# Patient Record
Sex: FEMALE | Race: WHITE
[De-identification: names, ages, dates, MRNs, and addresses within clinical notes are randomized per-mention and may not be internally consistent; named-entity substitution may affect disease eponyms.]

---

## 2021-11-21 ENCOUNTER — HOSPITAL ENCOUNTER (EMERGENCY)
Dept: HOSPITAL 33 - ED | Age: 26
LOS: 1 days | Discharge: HOME | End: 2021-11-22
Payer: COMMERCIAL

## 2021-11-21 DIAGNOSIS — R11.2: ICD-10-CM

## 2021-11-21 DIAGNOSIS — I10: ICD-10-CM

## 2021-11-21 DIAGNOSIS — K52.9: Primary | ICD-10-CM

## 2021-11-21 DIAGNOSIS — N39.0: ICD-10-CM

## 2021-11-21 LAB
ALBUMIN SERPL-MCNC: 4 G/DL (ref 3.5–5)
ALP SERPL-CCNC: 37 U/L (ref 38–126)
ALT SERPL-CCNC: 22 U/L (ref 0–35)
AMYLASE SERPL-CCNC: 52 U/L (ref 30–110)
ANION GAP SERPL CALC-SCNC: 14.4 MEQ/L (ref 5–15)
AST SERPL QL: 27 U/L (ref 14–36)
BASOPHILS # BLD AUTO: 0.04 10*3/UL (ref 0–0.4)
BASOPHILS NFR BLD AUTO: 0.3 % (ref 0–0.4)
BILIRUB BLD-MCNC: 0.4 MG/DL (ref 0.2–1.3)
BUN SERPL-MCNC: 15 MG/DL (ref 7–17)
CALCIUM SPEC-MCNC: 9.1 MG/DL (ref 8.4–10.2)
CHLORIDE SERPL-SCNC: 99 MMOL/L (ref 98–107)
CO2 SERPL-SCNC: 26 MMOL/L (ref 22–30)
CREAT SERPL-MCNC: 0.86 MG/DL (ref 0.52–1.04)
EOSINOPHIL # BLD AUTO: 0.12 10*3/UL (ref 0–0.5)
GFR SERPLBLD BASED ON 1.73 SQ M-ARVRAT: > 60 ML/MIN
GLUCOSE SERPL-MCNC: 114 MG/DL (ref 74–106)
GLUCOSE UR-MCNC: NEGATIVE MG/DL
HCT VFR BLD AUTO: 40.4 % (ref 35–47)
HGB BLD-MCNC: 13.5 GM/DL (ref 12–16)
LIPASE SERPL-CCNC: 122 U/L (ref 23–300)
LYMPHOCYTES # SPEC AUTO: 2.1 10*3/UL (ref 1–4.6)
MCH RBC QN AUTO: 29.7 PG (ref 26–32)
MCHC RBC AUTO-ENTMCNC: 33.4 G/DL (ref 32–36)
MONOCYTES # BLD AUTO: 1.84 10*3/UL (ref 0–1.3)
PLATELET # BLD AUTO: 316 K/MM3 (ref 150–450)
POTASSIUM SERPLBLD-SCNC: 3.4 MMOL/L (ref 3.5–5.1)
PROT SERPL-MCNC: 6.8 G/DL (ref 6.3–8.2)
PROT UR STRIP-MCNC: 100 MG/DL
RBC # BLD AUTO: 4.55 M/MM3 (ref 4.1–5.4)
RBC #/AREA URNS HPF: (no result) /HPF (ref 0–2)
SODIUM SERPL-SCNC: 136 MMOL/L (ref 137–145)
WBC # BLD AUTO: 15.9 K/MM3 (ref 4–10.5)
WBC #/AREA URNS HPF: (no result) /HPF (ref 0–5)

## 2021-11-21 PROCEDURE — 99284 EMERGENCY DEPT VISIT MOD MDM: CPT

## 2021-11-21 PROCEDURE — 96374 THER/PROPH/DIAG INJ IV PUSH: CPT

## 2021-11-21 PROCEDURE — 96360 HYDRATION IV INFUSION INIT: CPT

## 2021-11-21 PROCEDURE — 87086 URINE CULTURE/COLONY COUNT: CPT

## 2021-11-21 PROCEDURE — 36415 COLL VENOUS BLD VENIPUNCTURE: CPT

## 2021-11-21 PROCEDURE — 83605 ASSAY OF LACTIC ACID: CPT

## 2021-11-21 PROCEDURE — 96375 TX/PRO/DX INJ NEW DRUG ADDON: CPT

## 2021-11-21 PROCEDURE — 81001 URINALYSIS AUTO W/SCOPE: CPT

## 2021-11-21 PROCEDURE — 74176 CT ABD & PELVIS W/O CONTRAST: CPT

## 2021-11-21 PROCEDURE — 83690 ASSAY OF LIPASE: CPT

## 2021-11-21 PROCEDURE — 80053 COMPREHEN METABOLIC PANEL: CPT

## 2021-11-21 PROCEDURE — 36000 PLACE NEEDLE IN VEIN: CPT

## 2021-11-21 PROCEDURE — 82150 ASSAY OF AMYLASE: CPT

## 2021-11-21 PROCEDURE — 85025 COMPLETE CBC W/AUTO DIFF WBC: CPT

## 2021-11-21 NOTE — ERPHSYRPT
- History of Present Illness


Time Seen by Provider: 11/21/21 22:22


Historian: patient


Exam Limitations: no limitations


Physician History: 





This is a 26-year-old white female with a history of hypertension and presents 

with periumbilical, mid abdomen abdominal cramping that significant and began 

relatively sudden onset this morning.  It had nearly completely resolved then 

returned to worse this evening prior to arrival.  She has had several episodes 

of vomiting.  She has never had anything like this in the past.  She has had no 

prior abdominal surgeries.  The pain is localized in this region and does not 

radiate.  She has no chest pain.  She has no shortness of breath.  She has not 

had fevers.  She has had no diarrhea.


Timing/Duration: today


Abdominal Pain Onset Location: periumbilical


Pain Radiation: no radiation


Severity of Pain-Max: moderate


Severity of Pain-Current: moderate


Modifying Factors: Improves With: vomiting


Associated Symptoms: loss of appetite, nausea, vomiting, No chest pain, No 

diarrhea, No fever/chills, No shortness of breath


Previous symptoms: no prior history


Allergies/Adverse Reactions: 








No Known Drug Allergies Allergy (Unverified 11/21/21 22:24)


   





Home Medications: 








Cyclosporine 100 mg PO DAILY 11/21/21 [History]


Enalapril Maleate 10 mg*** [Vasotec 10 MG***] 10 mg PO DAILY 11/21/21 [History]


Hydrochlorothiazide 50 mg PO DAILY 11/21/21 [History]


Montelukast Sodium 10 mg PO DAILY 11/21/21 [History]


Prednisone 5 mg*** [Deltasone 5 mg***] 5 mg PO DAILY 11/21/21 [History]








Travel Risk





- International Travel


Have you traveled outside of the country in past 3 weeks: No





- Coronavirus Screening


Are you exhibiting any of the following symptoms?: No


Close contact with a COVID-19 positive Pt in past 14-21 Days: No





- Review of Systems


Constitutional: No Symptoms


Eyes: No Symptoms


Ears, Nose, & Throat: No Symptoms


Respiratory: No Symptoms


Cardiac: No Symptoms


Abdominal/Gastrointestinal: Abdominal Pain, Nausea, Vomiting


Genitourinary Symptoms: No Symptoms


Musculoskeletal: No Symptoms


Skin: No Symptoms


Neurological: No Symptoms


Psychological: No Symptoms


Endocrine: No Symptoms


Hematologic/Lymphatic: No Symptoms


Immunological/Allergic: No Symptoms


All Other Systems: Reviewed and Negative





- Past Medical History


Pertinent Past Medical History: Yes





- Past Surgical History


Past Surgical History: No





- Nursing Vital Signs


Nursing Vital Signs: 


                               Initial Vital Signs











Temperature  97.3 F   11/21/21 22:14


 


Pulse Rate  76   11/21/21 22:14


 


Respiratory Rate  18   11/21/21 22:14


 


Blood Pressure  120/78   11/21/21 22:14


 


O2 Sat by Pulse Oximetry  99   11/21/21 22:14








                                   Pain Scale











Pain Intensity                 10

















- Physical Exam


General Appearance: mild distress, alert, anxiety


Eye Exam: PERRL/EOMI, eyes nml inspection


Ears, Nose, Throat Exam: normal ENT inspection, moist mucous membranes


Neck Exam: normal inspection, non-tender, supple, full range of motion


Respiratory Exam: normal breath sounds, lungs clear, airway intact, No chest 

tenderness, No respiratory distress


Cardiovascular Exam: regular rate/rhythm, normal heart sounds, normal peripheral

 pulses


Gastrointestinal/Abdomen Exam: soft, normal bowel sounds, tenderness (Mid 

abdomen/periumbilical region), guarding, rebound, No pulsatile mass


Pelvic Exam: not done


Rectal Exam: not done


Back Exam: normal inspection, normal range of motion, No CVA tenderness, No 

vertebral tenderness


Extremity Exam: normal inspection, normal range of motion, pelvis stable


Neurologic Exam: alert, oriented x 3, cooperative, CNs II-XII nml as tested, 

normal mood/affect, nml cerebellar function, nml station & gait, sensation nml


Skin Exam: normal color, warm, dry


Lymphatic Exam: No adenopathy


**SpO2 Interpretation**: normal


SpO2: 99


O2 Delivery: Room Air





- Course


Nursing assessment & vital signs reviewed: Yes


Ordered Tests: 


                               Active Orders 24 hr











 Category Date Time Status


 


 IV Insertion STAT Care  11/21/21 22:37 Active


 


 ABDOMEN AND PELVIS W/0 CONTRAS [CT] Stat Exams  11/21/21 22:37 Taken


 


 AMYLASE Stat Lab  11/21/21 22:41 Completed


 


 CBC W DIFF Stat Lab  11/21/21 22:41 Completed


 


 CMP Stat Lab  11/21/21 22:41 Completed


 


 CULTURE,URINE Stat Lab  11/21/21 22:41 Received


 


 LIPASE Stat Lab  11/21/21 22:41 Completed


 


 Lactic Acid Stat Lab  11/21/21 22:52 Received


 


 UA W/RFX UR CULTURE Stat Lab  11/21/21 22:41 Completed








Medication Summary














Discontinued Medications














Generic Name Dose Route Start Last Admin





  Trade Name Freq  PRN Reason Stop Dose Admin


 


Hydromorphone HCl  1 mg  11/21/21 22:37  11/21/21 22:52





  Hydromorphone 1 Mg/1ml Inj*** 1 Mg/Ml Syringe  IV  11/21/21 22:38  1 mg





  STAT ONE   Administration


 


Hydromorphone HCl  Confirm  11/21/21 22:43 





  Hydromorphone 1 Mg/1ml Inj*** 1 Mg/Ml Syringe  Administered  11/21/21 22:44 





  Dose  





  1 mg  





  .ROUTE  





  .STK-MED ONE  


 


Sodium Chloride  1,000 mls @ 999 mls/hr  11/21/21 22:37  11/21/21 23:47





  Sodium Chloride 0.9% 1000 Ml  IV  11/21/21 23:37  Infused





  .Q1H1M STA   Infusion


 


Sodium Chloride  Confirm  11/21/21 22:43 





  Sodium Chloride 0.9% 1000 Ml  Administered  11/21/21 22:44 





  Dose  





  1,000 mls @ ud  





  .ROUTE  





  .STK-MED ONE  


 


Ceftriaxone Sodium/Dextrose  1 g in 50 mls @ 100 mls/hr  11/21/21 23:00  

11/21/21 23:47





  Rocephin 1 Gm-D5w 50 Ml Bag**  IV  11/21/21 23:29  Infused





  STAT STA   Infusion


 


Ceftriaxone Sodium/Dextrose  Confirm  11/21/21 23:04 





  Rocephin 1 Gm-D5w 50 Ml Bag**  Administered  11/21/21 23:05 





  Dose  





  1 g in 50 mls @ ud  





  IV  





  .STK-MED ONE  


 


Metronidazole  500 mg  11/21/21 23:52  11/21/21 23:53





  Metronidazole 500 Mg Tablet  PO  11/21/21 23:53  500 mg





  STAT ONE   Administration


 


Metronidazole  Confirm  11/21/21 23:51 





  Metronidazole 500 Mg Tablet  Administered  11/21/21 23:52 





  Dose  





  500 mg  





  .ROUTE  





  .STK-MED ONE  


 


Ondansetron HCl  4 mg  11/21/21 22:37  11/21/21 22:52





  Ondansetron Hcl 4 Mg/2 Ml Vial  IV  11/21/21 22:38  4 mg





  STAT ONE   Administration


 


Ondansetron HCl  Confirm  11/21/21 22:43 





  Ondansetron Hcl 4 Mg/2 Ml Vial  Administered  11/21/21 22:44 





  Dose  





  4 mg  





  .ROUTE  





  .STK-MED ONE  


 


Pantoprazole Sodium  40 mg  11/21/21 22:37  11/21/21 22:51





  Pantoprazole 40 Mg Vial  IV  11/21/21 22:38  40 mg





  STAT ONE   Administration


 


Pantoprazole Sodium  Confirm  11/21/21 22:43 





  Pantoprazole 40 Mg Vial  Administered  11/21/21 22:44 





  Dose  





  40 mg  





  IV  





  .STK-MED ONE  











Lab/Rad Data: 


                           Laboratory Result Diagrams





                                 11/21/21 22:41 





                                 11/21/21 22:41 





                               Laboratory Results











  11/21/21 11/21/21 11/21/21 Range/Units





  22:41 22:41 22:41 


 


WBC   15.9 H   (4.0-10.5)  K/mm3


 


RBC   4.55   (4.1-5.4)  M/mm3


 


Hgb   13.5   (12.0-16.0)  gm/dl


 


Hct   40.4   (35-47)  %


 


MCV   88.8   ()  fl


 


MCH   29.7   (26-32)  pg


 


MCHC   33.4   (32-36)  g/dl


 


RDW   12.1   (11.5-14.0)  %


 


Plt Count   316   (150-450)  K/mm3


 


MPV   9.9   (7.5-11.0)  fl


 


Gran %   74.1 H   (36.0-66.0)  %


 


Eos # (Auto)   0.12   (0-0.5)  


 


Absolute Lymphs (auto)   2.10   (1.0-4.6)  


 


Absolute Monos (auto)   1.84 H   (0.0-1.3)  


 


Lymphocytes %   13.2 L   (24.0-44.0)  %


 


Monocytes %   11.6   (0.0-12.0)  %


 


Eosinophils %   0.8   (0.00-5.0)  %


 


Basophils %   0.3   (0.0-0.4)  %


 


Absolute Granulocytes   11.83 H   (1.4-6.9)  


 


Basophils #   0.04   (0-0.4)  


 


Sodium  136 L    (137-145)  mmol/L


 


Potassium  3.4 L    (3.5-5.1)  mmol/L


 


Chloride  99    ()  mmol/L


 


Carbon Dioxide  26    (22-30)  mmol/L


 


Anion Gap  14.4    (5-15)  MEQ/L


 


BUN  15    (7-17)  mg/dL


 


Creatinine  0.86    (0.52-1.04)  mg/dL


 


Estimated GFR  > 60.0    ML/MIN


 


Glucose  114 H    ()  mg/dL


 


Calcium  9.1    (8.4-10.2)  mg/dL


 


Total Bilirubin  0.40    (0.2-1.3)  mg/dL


 


AST  27    (14-36)  U/L


 


ALT  22    (0-35)  U/L


 


Alkaline Phosphatase  37 L    ()  U/L


 


Serum Total Protein  6.8    (6.3-8.2)  g/dL


 


Albumin  4.0    (3.5-5.0)  g/dL


 


Amylase  52    ()  U/L


 


Lipase  122    ()  U/L


 


Urine Color    YELLOW  (YELLOW)  


 


Urine Appearance    SLIGHTLY CLOUDY  (CLEAR)  


 


Urine pH    7.0  (5-6)  


 


Ur Specific Gravity    1.021  (1.005-1.025)  


 


Urine Protein    100  (Negative)  


 


Urine Ketones    SMALL  (NEGATIVE)  


 


Urine Blood    SMALL  (0-5)  Ramirez/ul


 


Urine Nitrite    POSITIVE  (NEGATIVE)  


 


Urine Bilirubin    NEGATIVE  (NEGATIVE)  


 


Urine Urobilinogen    NEGATIVE  (0-1)  mg/dL


 


Ur Leukocyte Esterase    NEGATIVE  (NEGATIVE)  


 


Urine WBC (Auto)    3-5  (0-5)  /HPF


 


Urine RBC (Auto)    3-5  (0-2)  /HPF


 


U Epithel Cells (Auto)    RARE  (FEW)  /HPF


 


Urine Bacteria (Auto)    NONE  (NEGATIVE)  /HPF


 


Urine Mucus (Auto)    SLIGHT  (NEGATIVE)  /HPF


 


Urine Culture Reflexed    YES  (NO)  


 


Urine Glucose    NEGATIVE  (NEGATIVE)  mg/dL














- Progress


Progress: improved, pain not gone completely, re-examined


Progress Note: 





11/21/21 23:55


CAT scan of the abdomen and pelvis without contrast shows jejunal loops are 

thickened.  There is no evidence of obstruction or perforation.  The appendix is

 visualized and it is normal.  Picture is consistent with jejunitis.


Counseled pt/family regarding: lab results, diagnosis, need for follow-up, rad 

results





- Departure


Departure Disposition: Home


Clinical Impression: 


 Jejunitis, UTI (urinary tract infection)





Condition: Stable


Critical Care Time: No


Referrals: 


DOCTOR,NO FAMILY [Primary Care Provider] - Follow up/PCP as directed


Additional Instructions: 


Drink plenty of clear liquids.  Do not advance your diet until you are 

tolerating clear liquids well and you have minimal abdominal pain.  Take your 

antibiotics as prescribed.  Follow-up with your primary care physician for 

further management.


Prescriptions: 


Ondansetron ODT 4 MG*** [Zofran Odt 4 mg***] 4 mg PO Q6H PRN PRN #10 tablet


 PRN Reason: Vomiting


Hydrocodone/APAP 5/325*** [Norco 5/325 mg***] 1 each PO Q8H PRN PRN #6 tablet 

MDD 3


 PRN Reason: Pain


Ciprofloxacin [Cipro 500 MG***] 500 mg PO BID #14 tablet


Metronidazole 500 mg*** [Flagyl 500 MG***] 500 mg PO TID #21 tablet

## 2021-11-22 VITALS — SYSTOLIC BLOOD PRESSURE: 126 MMHG | HEART RATE: 50 BPM | OXYGEN SATURATION: 99 % | DIASTOLIC BLOOD PRESSURE: 88 MMHG

## 2021-11-22 LAB — BLD SMEAR INTERP: YES

## 2021-11-22 NOTE — XRAY
Indication: Epigastric pain and nausea.



Multiple contiguous axial images obtained through the abdomen and pelvis

without contrast.



Comparison: None



Lung bases clear.  Heart not enlarged.



Noncontrasted stomach and bowel loops are nonobstructed.  Degenerative

demonstrates moderate circumferential wall thickening with minimal stranding

favoring enteritis.  Pelvis demonstrates small free fluid presumed reactive.

No walled off fluid collection or free air.  Normal appendix.  Mild diffuse

scattered colonic fecal debris throughout.  1 cm uterine calcified fibroid.



Remaining liver, gallbladder, pancreas, spleen, adrenal glands, kidneys,

ureters, bladder, uterus, and aorta are unremarkable for noncontrast exam.



Osseous structures intact.



Impression:

1.  Jejunal bowel wall thickening and stranding favoring enteritis.  Pelvic

free fluid presumed reactive.

2.  Small uterine calcified fibroid.

3.  Mild diffuse fecal stasis.



Comment: Preliminary interpretation made by [facility].  No critical discrepancy.